# Patient Record
Sex: FEMALE | Race: WHITE
[De-identification: names, ages, dates, MRNs, and addresses within clinical notes are randomized per-mention and may not be internally consistent; named-entity substitution may affect disease eponyms.]

---

## 2021-02-25 ENCOUNTER — HOSPITAL ENCOUNTER (EMERGENCY)
Dept: HOSPITAL 76 - ED | Age: 41
Discharge: HOME | End: 2021-02-25
Payer: MEDICARE

## 2021-02-25 VITALS — DIASTOLIC BLOOD PRESSURE: 85 MMHG | SYSTOLIC BLOOD PRESSURE: 132 MMHG

## 2021-02-25 DIAGNOSIS — R09.81: ICD-10-CM

## 2021-02-25 DIAGNOSIS — M54.2: Primary | ICD-10-CM

## 2021-02-25 DIAGNOSIS — D89.89: ICD-10-CM

## 2021-02-25 LAB
ALBUMIN DIAFP-MCNC: 4.6 G/DL (ref 3.2–5.5)
ALBUMIN/GLOB SERPL: 1.6 {RATIO} (ref 1–2.2)
ALP SERPL-CCNC: 65 IU/L (ref 42–121)
ALT SERPL W P-5'-P-CCNC: 27 IU/L (ref 10–60)
ANION GAP SERPL CALCULATED.4IONS-SCNC: 9 MMOL/L (ref 6–13)
AST SERPL W P-5'-P-CCNC: 20 IU/L (ref 10–42)
BASOPHILS NFR BLD AUTO: 0 10^3/UL (ref 0–0.1)
BASOPHILS NFR BLD AUTO: 0.6 %
BILIRUB BLD-MCNC: 0.8 MG/DL (ref 0.2–1)
BUN SERPL-MCNC: 11 MG/DL (ref 6–20)
CALCIUM UR-MCNC: 9.2 MG/DL (ref 8.5–10.3)
CHLORIDE SERPL-SCNC: 101 MMOL/L (ref 101–111)
CO2 SERPL-SCNC: 25 MMOL/L (ref 21–32)
CREAT SERPLBLD-SCNC: 0.7 MG/DL (ref 0.4–1)
EOSINOPHIL # BLD AUTO: 0.1 10^3/UL (ref 0–0.7)
EOSINOPHIL NFR BLD AUTO: 0.9 %
ERYTHROCYTE [DISTWIDTH] IN BLOOD BY AUTOMATED COUNT: 13.2 % (ref 12–15)
GFRSERPLBLD MDRD-ARVRAT: 92 ML/MIN/{1.73_M2} (ref 89–?)
GLOBULIN SER-MCNC: 2.9 G/DL (ref 2.1–4.2)
GLUCOSE SERPL-MCNC: 89 MG/DL (ref 70–100)
HCT VFR BLD AUTO: 41.8 % (ref 37–47)
HGB UR QL STRIP: 13.4 G/DL (ref 12–16)
LIPASE SERPL-CCNC: 33 U/L (ref 22–51)
LYMPHOCYTES # SPEC AUTO: 2.4 10^3/UL (ref 1.5–3.5)
LYMPHOCYTES NFR BLD AUTO: 33.9 %
MCH RBC QN AUTO: 28.9 PG (ref 27–31)
MCHC RBC AUTO-ENTMCNC: 32.1 G/DL (ref 32–36)
MCV RBC AUTO: 90.1 FL (ref 81–99)
MONOCYTES # BLD AUTO: 0.4 10^3/UL (ref 0–1)
MONOCYTES NFR BLD AUTO: 5.4 %
NEUTROPHILS # BLD AUTO: 4.1 10^3/UL (ref 1.5–6.6)
NEUTROPHILS # SNV AUTO: 7 X10^3/UL (ref 4.8–10.8)
NEUTROPHILS NFR BLD AUTO: 58.9 %
NRBC # BLD AUTO: 0 /100WBC
NRBC # BLD AUTO: 0 X10^3/UL
PDW BLD AUTO: 9.5 FL (ref 7.9–10.8)
PLATELET # BLD: 277 10^3/UL (ref 130–450)
POTASSIUM SERPL-SCNC: 3.8 MMOL/L (ref 3.5–5)
PROT SPEC-MCNC: 7.5 G/DL (ref 6.7–8.2)
RBC MAR: 4.64 10^6/UL (ref 4.2–5.4)
SODIUM SERPLBLD-SCNC: 135 MMOL/L (ref 135–145)

## 2021-02-25 PROCEDURE — 36415 COLL VENOUS BLD VENIPUNCTURE: CPT

## 2021-02-25 PROCEDURE — 84484 ASSAY OF TROPONIN QUANT: CPT

## 2021-02-25 PROCEDURE — 70491 CT SOFT TISSUE NECK W/DYE: CPT

## 2021-02-25 PROCEDURE — 83690 ASSAY OF LIPASE: CPT

## 2021-02-25 PROCEDURE — 80053 COMPREHEN METABOLIC PANEL: CPT

## 2021-02-25 PROCEDURE — 99284 EMERGENCY DEPT VISIT MOD MDM: CPT

## 2021-02-25 PROCEDURE — 84702 CHORIONIC GONADOTROPIN TEST: CPT

## 2021-02-25 PROCEDURE — 85025 COMPLETE CBC W/AUTO DIFF WBC: CPT

## 2021-02-25 PROCEDURE — 93005 ELECTROCARDIOGRAM TRACING: CPT

## 2021-02-25 NOTE — CT REPORT
PROCEDURE:  SOFT TISSUE NECK W

 

INDICATIONS:  left neck/dental pain; ? infection. on oral chemo

 

CONTRAST: Nonionic IV

 

TECHNIQUE:  

After the administration of intravenous contrast, 3.0 mm axial sections acquired from the sella to th
e aortic arch.  Additional oblique axial 3.0 mm sections acquired through the pharynx.  3 mm thick co
ananya reformats were generated.  For radiation dose reduction, the following was used:  automated exp
osure control, adjustment of mA and/or kV according to patient size.

 

COMPARISON:  None.

 

FINDINGS:  

Image quality:  Excellent.  

 

Lymph nodes:  No enlarged lymph nodes seen throughout the neck.  

 

Vessels:  Visualized vasculature appears patent.  

 

Neck spaces: Scrutiny is given to the areas of clinical concern, as marked by the patient BB markers 
on the left. At these sites, no significant inflammatory changes or masses can be seen.

 

The oropharynx, nasopharynx, and pharynx demonstrate no mucosal lesions.  The vocal cords, false voca
l cords, pyriform sinuses, epiglottis, vallecula, and tongue base all appear normal.  

 

Glands:  The parotid and submandibular glands appear normal.  The thyroid is normal in size. 

 

Miscellaneous:  Visualized brain and orbits appear normal.  Lung apices appear clear.  Superficial so
ft tissues appear normal.

 

Bones:  No suspicious bony lesions.  Visualized sinuses and mastoids appear unremarkable.  No signifi
cant dental abnormality can be seen.

 

 

 

IMPRESSION: No significant abnormalities can be seen at the areas of clinical concern.

 

No abscess can be seen.

 

 

 

Note: No significant discrepancy from the preliminary report.

 

Reviewed by: Jaya Buck MD on 2/25/2021 4:58 PM [facility]

Approved by: Jaya Buck MD on 2/25/2021 4:58 PM [facility]

 

 

Station ID:  [id number]

## 2021-03-02 ENCOUNTER — HOSPITAL ENCOUNTER (OUTPATIENT)
Dept: HOSPITAL 76 - LAB.S | Age: 41
Discharge: HOME | End: 2021-03-02
Attending: INTERNAL MEDICINE
Payer: MEDICARE

## 2021-03-02 DIAGNOSIS — Z79.899: ICD-10-CM

## 2021-03-02 DIAGNOSIS — Z51.81: Primary | ICD-10-CM

## 2021-03-02 LAB
BASOPHILS NFR BLD AUTO: 0 10^3/UL (ref 0–0.1)
BASOPHILS NFR BLD AUTO: 0.4 %
EOSINOPHIL # BLD AUTO: 0.1 10^3/UL (ref 0–0.7)
EOSINOPHIL NFR BLD AUTO: 1.6 %
ERYTHROCYTE [DISTWIDTH] IN BLOOD BY AUTOMATED COUNT: 13.4 % (ref 12–15)
HCT VFR BLD AUTO: 42.3 % (ref 37–47)
HGB UR QL STRIP: 13.2 G/DL (ref 12–16)
LYMPHOCYTES # SPEC AUTO: 2.4 10^3/UL (ref 1.5–3.5)
LYMPHOCYTES NFR BLD AUTO: 32 %
MCH RBC QN AUTO: 28.5 PG (ref 27–31)
MCHC RBC AUTO-ENTMCNC: 31.2 G/DL (ref 32–36)
MCV RBC AUTO: 91.4 FL (ref 81–99)
MONOCYTES # BLD AUTO: 0.4 10^3/UL (ref 0–1)
MONOCYTES NFR BLD AUTO: 4.9 %
NEUTROPHILS # BLD AUTO: 4.6 10^3/UL (ref 1.5–6.6)
NEUTROPHILS # SNV AUTO: 7.5 X10^3/UL (ref 4.8–10.8)
NEUTROPHILS NFR BLD AUTO: 60.8 %
NRBC # BLD AUTO: 0 /100WBC
NRBC # BLD AUTO: 0 X10^3/UL
PDW BLD AUTO: 10.2 FL (ref 7.9–10.8)
PLATELET # BLD: 278 10^3/UL (ref 130–450)
RBC MAR: 4.63 10^6/UL (ref 4.2–5.4)

## 2021-03-02 PROCEDURE — 85025 COMPLETE CBC W/AUTO DIFF WBC: CPT

## 2021-03-02 PROCEDURE — 36415 COLL VENOUS BLD VENIPUNCTURE: CPT

## 2021-03-03 ENCOUNTER — HOSPITAL ENCOUNTER (OUTPATIENT)
Dept: HOSPITAL 76 - LAB.S | Age: 41
Discharge: HOME | End: 2021-03-03
Attending: INTERNAL MEDICINE
Payer: MEDICARE

## 2021-03-03 DIAGNOSIS — Z79.899: ICD-10-CM

## 2021-03-03 DIAGNOSIS — Z51.81: Primary | ICD-10-CM

## 2021-03-03 LAB
ALT SERPL W P-5'-P-CCNC: 28 IU/L (ref 10–60)
AST SERPL W P-5'-P-CCNC: 21 IU/L (ref 10–42)
BASOPHILS NFR BLD AUTO: 0 10^3/UL (ref 0–0.1)
BASOPHILS NFR BLD AUTO: 0.5 %
CREAT SERPLBLD-SCNC: 0.7 MG/DL (ref 0.4–1)
CRP SERPL-MCNC: 1.2 MG/DL (ref 0–1)
EOSINOPHIL # BLD AUTO: 0.1 10^3/UL (ref 0–0.7)
EOSINOPHIL NFR BLD AUTO: 1.1 %
ERYTHROCYTE [DISTWIDTH] IN BLOOD BY AUTOMATED COUNT: 13.4 % (ref 12–15)
GFRSERPLBLD MDRD-ARVRAT: 92 ML/MIN/{1.73_M2} (ref 89–?)
HCT VFR BLD AUTO: 40.6 % (ref 37–47)
HGB UR QL STRIP: 13 G/DL (ref 12–16)
LYMPHOCYTES # SPEC AUTO: 2.2 10^3/UL (ref 1.5–3.5)
LYMPHOCYTES NFR BLD AUTO: 28.8 %
MCH RBC QN AUTO: 29 PG (ref 27–31)
MCHC RBC AUTO-ENTMCNC: 32 G/DL (ref 32–36)
MCV RBC AUTO: 90.6 FL (ref 81–99)
MONOCYTES # BLD AUTO: 0.4 10^3/UL (ref 0–1)
MONOCYTES NFR BLD AUTO: 4.6 %
NEUTROPHILS # BLD AUTO: 4.9 10^3/UL (ref 1.5–6.6)
NEUTROPHILS # SNV AUTO: 7.6 X10^3/UL (ref 4.8–10.8)
NEUTROPHILS NFR BLD AUTO: 64.9 %
NRBC # BLD AUTO: 0 /100WBC
NRBC # BLD AUTO: 0 X10^3/UL
PDW BLD AUTO: 10.3 FL (ref 7.9–10.8)
PLATELET # BLD: 287 10^3/UL (ref 130–450)
RBC MAR: 4.48 10^6/UL (ref 4.2–5.4)

## 2021-03-03 PROCEDURE — 84460 ALANINE AMINO (ALT) (SGPT): CPT

## 2021-03-03 PROCEDURE — 36415 COLL VENOUS BLD VENIPUNCTURE: CPT

## 2021-03-03 PROCEDURE — 85025 COMPLETE CBC W/AUTO DIFF WBC: CPT

## 2021-03-03 PROCEDURE — 85651 RBC SED RATE NONAUTOMATED: CPT

## 2021-03-03 PROCEDURE — 86140 C-REACTIVE PROTEIN: CPT

## 2021-03-03 PROCEDURE — 84450 TRANSFERASE (AST) (SGOT): CPT

## 2021-03-03 PROCEDURE — 82565 ASSAY OF CREATININE: CPT

## 2021-03-09 ENCOUNTER — HOSPITAL ENCOUNTER (OUTPATIENT)
Dept: HOSPITAL 76 - DI.S | Age: 41
Discharge: HOME | End: 2021-03-09
Attending: INTERNAL MEDICINE
Payer: MEDICARE

## 2021-03-09 DIAGNOSIS — R05: Primary | ICD-10-CM

## 2021-03-09 NOTE — XRAY REPORT
PROCEDURE:  Chest 2 View X-Ray

 

INDICATIONS:  COUGH

 

TECHNIQUE:  2 view(s) of the chest.  

 

COMPARISON:  None.

 

FINDINGS:  

 

Surgical changes and devices:  None.  

 

Lungs and pleura:  No pleural effusions or pneumothorax.  Lungs are clear.  

 

Mediastinum:  Mediastinal contours are normal.  Heart size is normal.  

 

Bones and chest wall:  No suspicious bony abnormalities.  Soft tissues appear unremarkable.  

 

IMPRESSION:  Normal for age, source of current symptoms is not seen.

 

Reviewed by: Tonny Vazquez MD on 3/9/2021 5:08 PM [facility]

Approved by: Tonny Vazquez MD on 3/9/2021 5:08 PM [facility]

 

 

Station ID:  [id number]

## 2021-03-30 ENCOUNTER — HOSPITAL ENCOUNTER (OUTPATIENT)
Dept: HOSPITAL 76 - RT | Age: 41
Discharge: HOME | End: 2021-03-30
Attending: INTERNAL MEDICINE
Payer: MEDICARE

## 2021-03-30 DIAGNOSIS — J45.909: Primary | ICD-10-CM

## 2021-03-30 PROCEDURE — 94060 EVALUATION OF WHEEZING: CPT

## 2021-03-30 PROCEDURE — 94729 DIFFUSING CAPACITY: CPT

## 2021-05-26 ENCOUNTER — HOSPITAL ENCOUNTER (OUTPATIENT)
Dept: HOSPITAL 76 - LAB.S | Age: 41
Discharge: HOME | End: 2021-05-26
Attending: INTERNAL MEDICINE
Payer: MEDICARE

## 2021-05-26 DIAGNOSIS — Z01.84: Primary | ICD-10-CM

## 2021-05-26 DIAGNOSIS — B34.9: ICD-10-CM

## 2021-05-26 LAB — HETEROPH AB SER QL: NEGATIVE

## 2021-05-26 PROCEDURE — 86308 HETEROPHILE ANTIBODY SCREEN: CPT

## 2021-05-26 PROCEDURE — 86769 SARS-COV-2 COVID-19 ANTIBODY: CPT

## 2021-05-26 PROCEDURE — 36415 COLL VENOUS BLD VENIPUNCTURE: CPT

## 2021-12-15 ENCOUNTER — HOSPITAL ENCOUNTER (OUTPATIENT)
Dept: HOSPITAL 76 - LAB.S | Age: 41
Discharge: HOME | End: 2021-12-15
Attending: INTERNAL MEDICINE
Payer: MEDICARE

## 2021-12-15 DIAGNOSIS — B97.89: ICD-10-CM

## 2021-12-15 DIAGNOSIS — Z01.84: Primary | ICD-10-CM

## 2021-12-15 PROCEDURE — 86769 SARS-COV-2 COVID-19 ANTIBODY: CPT

## 2022-05-03 ENCOUNTER — HOSPITAL ENCOUNTER (OUTPATIENT)
Dept: HOSPITAL 76 - LAB.S | Age: 42
LOS: 1 days | Discharge: HOME | End: 2022-05-04
Attending: PHYSICIAN ASSISTANT
Payer: MEDICARE

## 2022-05-03 ENCOUNTER — HOSPITAL ENCOUNTER (OUTPATIENT)
Dept: HOSPITAL 76 - DI.S | Age: 42
Discharge: HOME | End: 2022-05-03
Attending: PHYSICIAN ASSISTANT
Payer: MEDICARE

## 2022-05-03 DIAGNOSIS — Z20.822: ICD-10-CM

## 2022-05-03 DIAGNOSIS — J34.89: Primary | ICD-10-CM

## 2022-05-03 DIAGNOSIS — R05.9: ICD-10-CM

## 2022-05-03 DIAGNOSIS — J34.89: ICD-10-CM

## 2022-05-03 DIAGNOSIS — R05.8: Primary | ICD-10-CM

## 2022-05-03 PROCEDURE — 71046 X-RAY EXAM CHEST 2 VIEWS: CPT

## 2022-05-03 NOTE — XRAY REPORT
PROCEDURE:  Chest 2 View X-Ray

 

INDICATIONS:  PRODUCTIVE COUGH

 

TECHNIQUE:  2 view(s) of the chest.  

 

COMPARISON:  March 9, 2021

 

 

FINDINGS: 

SUPPORT DEVICES: None.

 

LUNGS/PLEURA: No focal consolidation, pleural effusion or space-occupying pneumothorax.

 

MEDIASTINUM: The cardiomediastinal silhouette is within normal limits.

 

BONES/SOFT TISSUES: No acute abnormality.

 

IMPRESSION: 

1.No acute cardiopulmonary abnormality.

 

 

 

Reviewed by: Yared Pink MD on 5/3/2022 12:12 PM PDT

Approved by: Yared Pink MD on 5/3/2022 12:12 PM PDT

 

 

Station ID:  [id number]

## 2022-06-22 ENCOUNTER — HOSPITAL ENCOUNTER (OUTPATIENT)
Dept: HOSPITAL 76 - LAB.S | Age: 42
Discharge: HOME | End: 2022-06-22
Attending: INTERNAL MEDICINE
Payer: MEDICARE

## 2022-06-22 DIAGNOSIS — Z11.3: Primary | ICD-10-CM

## 2022-06-22 LAB
C TRACH DNA SPEC NAA+PROBE-ACNC: NEGATIVE
N GONORRHOEA DNA GENITAL QL NAA+PROBE: NEGATIVE
T VAGINALIS RRNA GENITAL QL PROBE: NEGATIVE

## 2022-06-22 PROCEDURE — 87389 HIV-1 AG W/HIV-1&-2 AB AG IA: CPT

## 2022-06-22 PROCEDURE — 87340 HEPATITIS B SURFACE AG IA: CPT

## 2022-06-22 PROCEDURE — 86695 HERPES SIMPLEX TYPE 1 TEST: CPT

## 2022-06-22 PROCEDURE — 86705 HEP B CORE ANTIBODY IGM: CPT

## 2022-06-22 PROCEDURE — 36415 COLL VENOUS BLD VENIPUNCTURE: CPT

## 2022-06-22 PROCEDURE — 86803 HEPATITIS C AB TEST: CPT

## 2022-06-22 PROCEDURE — 87529 HSV DNA AMP PROBE: CPT

## 2022-06-22 PROCEDURE — 86780 TREPONEMA PALLIDUM: CPT

## 2022-06-22 PROCEDURE — 86709 HEPATITIS A IGM ANTIBODY: CPT

## 2022-06-22 PROCEDURE — 87491 CHLMYD TRACH DNA AMP PROBE: CPT

## 2022-06-22 PROCEDURE — 86592 SYPHILIS TEST NON-TREP QUAL: CPT

## 2022-06-22 PROCEDURE — 87591 N.GONORRHOEAE DNA AMP PROB: CPT

## 2022-06-22 PROCEDURE — 87661 TRICHOMONAS VAGINALIS AMPLIF: CPT

## 2022-06-22 PROCEDURE — 86696 HERPES SIMPLEX TYPE 2 TEST: CPT

## 2022-06-23 LAB
HCV AB S/CO SERPL IA: <0.1 S/CO RATIO (ref 0–0.9)
HSV1 IGG SER IA-ACNC: <0.91 INDEX (ref 0–0.9)
HSV2 IGG SER IA-ACNC: <0.91 INDEX (ref 0–0.9)

## 2023-04-30 ENCOUNTER — HOSPITAL ENCOUNTER (EMERGENCY)
Dept: HOSPITAL 76 - ED | Age: 43
Discharge: HOME | End: 2023-04-30
Payer: MEDICARE

## 2023-04-30 VITALS — DIASTOLIC BLOOD PRESSURE: 75 MMHG | SYSTOLIC BLOOD PRESSURE: 131 MMHG

## 2023-04-30 DIAGNOSIS — R21: Primary | ICD-10-CM

## 2023-04-30 PROCEDURE — 99283 EMERGENCY DEPT VISIT LOW MDM: CPT

## 2023-04-30 PROCEDURE — 99282 EMERGENCY DEPT VISIT SF MDM: CPT

## 2023-04-30 NOTE — ED PHYSICIAN DOCUMENTATION
PD HPI SKIN





- Stated complaint


Stated Complaint: RASH ON FACE





- Chief complaint


Chief Complaint: Wound





- History obtained from


History obtained from: Patient





- Additional information


Additional information: 


Patient is a 42-year-old female presenting for evaluation of a rash that 

developed on her face yesterday and has spread to her chest and back.  She 

describes the rash as feeling itchy.  There are pustules to the forehead 

region.She tried Benadryl and triamcinolone Yesterday.  She says that the 

swelling she was experiencing yesterday to her face does seem better.  She 

denies any new known exposures.She does report having newly developed allergies 

to various foods since having COVID last year. She has had outbreaks of various 

rashes and has seen a dermatologist at the [facility].  She did 

send a message through the portal system today but did not receive an answer 

back.She denies any new known foods, detergents, face lotions or soaps.She 

denies any chest pain or shortness of breath.  She denies any fevers.








Review of Systems


Constitutional: denies: Fever


Cardiac: denies: Chest pain / pressure


Respiratory: denies: Dyspnea


GI: denies: Abdominal Pain


Skin: reports: Rash





PD PAST MEDICAL HISTORY





- Past Medical History


GYN: Ovarian cysts


: Other


Musculoskeletal: Osteoarthritis, Fibromyalgia, Other





- Past Surgical History


Past Surgical History: Yes


General: Appendectomy, Colonoscopy, EGD





- Present Medications


Home Medications: 


                                Ambulatory Orders











 Medication  Instructions  Recorded  Confirmed


 


Dextroamphetamine/Amphetamine 1 tab DAILY 02/25/21 02/25/21





[Adderall 7.5 mg Tablet]   


 


Methotrexate [Methotrexate Sodium] 7 tab 02/25/21 


 


Mupirocin 1 applic TP TID 7 Days #22 gm 04/30/23 


 


predniSONE [Deltasone] 60 mg PO DAILY 4 Days #12 tablet 04/30/23 














- Allergies


Allergies/Adverse Reactions: 


                                    Allergies











Allergy/AdvReac Type Severity Reaction Status Date / Time


 


amoxicillin [From Augmentin] Allergy  Unknown Verified 04/30/23 10:14


 


cephalexin [From Keflex] Allergy  Unknown Verified 04/30/23 10:14


 


clavulanic acid Allergy  Unknown Verified 04/30/23 10:14





[From Augmentin]     


 


codeine Allergy  Unknown Verified 04/30/23 10:14


 


duloxetine Allergy  Unknown Verified 04/30/23 10:14


 


levofloxacin [From Levaquin] Allergy  Unknown Verified 04/30/23 10:14


 


norgestimate Allergy  Unknown Verified 04/30/23 10:14


 


prazosin Allergy  Unknown Verified 04/30/23 10:14


 


Sulfa (Sulfonamide Allergy  Unknown Verified 04/30/23 10:14





Antibiotics)     


 


dethogestrel Allergy  Unknown Uncoded 04/30/23 10:14














- Social History


Does the pt smoke?: No


Smoking Status: Never smoker





PD ED PE NORMAL





- General


General: Alert and oriented X 3, No acute distress, Well developed/nourished





- HEENT


HEENT: Atraumatic, Moist mucous membranes, Pharynx benign





- Neck


Neck: Supple, no meningeal sign





- Cardiac


Cardiac: RRR, No murmur





- Respiratory


Respiratory: No respiratory distress, Clear bilaterally





- Abdomen


Abdomen: Soft, Non tender





- Derm


Derm: Other (Pustular rash to forehead, Papular erythema to lower face, no oral 

involvement; blanching erythema to chest and back)





- Neuro


Neuro: Normal speech





Results





- Vitals


Vitals: 


                               Vital Signs - 24 hr











  04/30/23 04/30/23





  10:08 10:40


 


Temperature 36.9 C 


 


Heart Rate 71 65


 


Respiratory 17 18





Rate  


 


Blood Pressure 135/91 H 131/75 H


 


O2 Saturation 97 100








                                     Oxygen











O2 Source                      Room air

















PD Medical Decision Making





- ED course


ED course: 


Patient presenting for evaluation of rash to her face and upper torso.  Her 

vital signs are stable.  She has no involvement of mucosal membranes.  She is 

otherwise well-appearing.  She has had prior rashes with unclear etiologies and 

does report having a developed allergies since having COVID last year.  Due to 

widespread involvement I believe oral steroids would be indicated as it is itchy

and appears to be somewhat allergic in nature.There is a pustular rash to the 

forehead.  Discussed treatment with antibiotic ointment which she is agreeable 

to. Does not want oral antibiotics at this time.  Does not appear to be 

cellulitis. She has a dermatologist through the [facility].  She has 

already reached out to them through their patient messaging system and was 

advised to have close follow-up.  She is comfortable with our treatment plan is 

advised on concerning symptoms to return for.








Departure





- Departure


Disposition: 01 Home, Self Care


Clinical Impression: 


 Rash and nonspecific skin eruption





Condition: Stable


Instructions:  ED Dermatitis Non Specific Rash


Prescriptions: 


predniSONE [Deltasone] 60 mg PO DAILY 4 Days #12 tablet


Mupirocin 1 applic TP TID 7 Days #22 gm


Comments: 


The exact etiology of your rash is unclear. There might be 2 processes going on.

 I am starting you on an oral steroid called prednisone for the next 5 days 

given the spread of the rash.  I am also prescribing a topical antibiotic 

ointment for use over the pustular area on your forehead.  I would recommend 

close follow-up with your dermatologist tomorrow for further evaluation and 

recommendations.  If you develop any new or worsening symptoms you can always 

return to the emergency department.  I would continue with Benadryl to help 

prevent itching of any irritated areas.





I have sent your prescriptions to [facility] in [place].


Discharge Date/Time: 04/30/23 10:40

## 2023-05-01 ENCOUNTER — HOSPITAL ENCOUNTER (EMERGENCY)
Dept: HOSPITAL 76 - ED | Age: 43
Discharge: HOME | End: 2023-05-01
Payer: MEDICARE

## 2023-05-01 VITALS — SYSTOLIC BLOOD PRESSURE: 149 MMHG | DIASTOLIC BLOOD PRESSURE: 104 MMHG

## 2023-05-01 DIAGNOSIS — R21: Primary | ICD-10-CM

## 2023-05-01 PROCEDURE — 87529 HSV DNA AMP PROBE: CPT

## 2023-05-01 PROCEDURE — 86787 VARICELLA-ZOSTER ANTIBODY: CPT

## 2023-05-01 PROCEDURE — 99283 EMERGENCY DEPT VISIT LOW MDM: CPT

## 2023-05-01 PROCEDURE — 87205 SMEAR GRAM STAIN: CPT

## 2023-05-01 PROCEDURE — 87070 CULTURE OTHR SPECIMN AEROBIC: CPT

## 2023-05-01 NOTE — ED PHYSICIAN DOCUMENTATION
PD HPI SKIN





- Stated complaint


Stated Complaint: SENT BY PCP





- Chief complaint


Chief Complaint: Wound





- History obtained from


History obtained from: Patient





- History of Present Illness


Pain level max: 0


Pain level now: 0





- Additional information


Additional information: 





42-year-old female states that she has had a rash to the forehead, chest, back, 

buttocks ongoing for the past several months.  She states it did improve 

temporarily with doxycycline.  She states that she saw a dermatologist at the 

[facility] who wanted her to have a herpes swab and varicella 

titers.  He told her that next time she has an outbreak to come and be 

evaluated.  She attempted to get the order done at the lab but the order was not

signed, therefore she has checked into the emergency department.





Review of Systems


Constitutional: denies: Fever, Chills


Nose: denies: Rhinorrhea / runny nose, Congestion


Respiratory: denies: Cough


GI: denies: Abdominal Pain, Nausea, Vomiting, Diarrhea


Skin: denies: Rash


Musculoskeletal: denies: Neck pain, Back pain


Neurologic: denies: Headache





PD PAST MEDICAL HISTORY





- Past Medical History


Past Medical History: Yes


GYN: Ovarian cysts


: Other


Musculoskeletal: Osteoarthritis, Fibromyalgia, Other





- Past Surgical History


Past Surgical History: Yes


General: Appendectomy, Colonoscopy, EGD





- Present Medications


Home Medications: 


                                Ambulatory Orders











 Medication  Instructions  Recorded  Confirmed


 


Dextroamphetamine/Amphetamine 1 tab DAILY 02/25/21 02/25/21





[Adderall 7.5 mg Tablet]   


 


Methotrexate [Methotrexate Sodium] 7 tab 02/25/21 


 


Mupirocin 1 applic TP TID 7 Days #22 gm 04/30/23 


 


predniSONE [Deltasone] 60 mg PO DAILY 4 Days #12 tablet 04/30/23 


 


Doxycycline Monohydrate 100 mg PO BID #42 cap 05/01/23 


 


Valacyclovir HCl [Valtrex] 1,000 mg PO TID #21 tablet 05/01/23 














- Allergies


Allergies/Adverse Reactions: 


                                    Allergies











Allergy/AdvReac Type Severity Reaction Status Date / Time


 


amoxicillin [From Augmentin] Allergy  Unknown Verified 05/01/23 15:15


 


cephalexin [From Keflex] Allergy  Unknown Verified 05/01/23 15:15


 


clavulanic acid Allergy  Unknown Verified 05/01/23 15:15





[From Augmentin]     


 


codeine Allergy  Unknown Verified 05/01/23 15:15


 


duloxetine Allergy  Unknown Verified 04/30/23 10:14


 


levofloxacin [From Levaquin] Allergy  Unknown Verified 05/01/23 15:15


 


norgestimate Allergy  Unknown Verified 05/01/23 15:15


 


prazosin Allergy  Unknown Verified 05/01/23 15:15


 


Sulfa (Sulfonamide Allergy  Unknown Verified 05/01/23 15:15





Antibiotics)     


 


dethogestrel Allergy  Unknown Uncoded 05/01/23 15:15














- Social History


Does the pt smoke?: No


Smoking Status: Never smoker





- POLST


Patient has POLST: No





PD ED PE NORMAL





- Vitals


Vital signs reviewed: Yes





- General


General: Alert and oriented X 3, No acute distress





- HEENT


HEENT: Moist mucous membranes





- Neck


Neck: Supple, no meningeal sign





- Cardiac


Cardiac: RRR





- Respiratory


Respiratory: No respiratory distress, Clear bilaterally





- Derm


Derm: Warm and dry





- Neuro


Neuro: Alert and oriented X 3





- Free text exam


Free text exam: 





Patient has a pustular rash on the forehead, also on the neck.





Results





- Vitals


Vitals: 


                               Vital Signs - 24 hr











  05/01/23





  15:07


 


Temperature 36.6 C


 


Heart Rate 76


 


Respiratory 16





Rate 


 


Blood Pressure 149/104 H


 


O2 Saturation 98








                                     Oxygen











O2 Source                      Room air

















- Labs


Labs: 


                                  Microbiology











 05/01/23 15:31 Wound Culture - Preliminary





 Face - Forehead 














PD Medical Decision Making





- ED course


Complexity details: re-evaluated patient, considered differential, d/w patient


ED course: 





Patient has had recurrent pustular rash.  Reports occasionally possibly 

vesicular.  The test that her dermatologist requested were ordered.  She states 

that it did improve once with doxycycline, but she was only on this for a week. 

Possible folliculitis versus pustular acne versus hormonal rash.  We will place 

on doxycycline for home.  Her dermatologist also requests valacyclovir.  We will

place her on this as well.  Patient is well-appearing, nontoxic.  Afebrile.  

Patient counseled regarding signs and symptoms for which I believe and urgent 

re-evaluation would be necessary. Patient with good understanding of and 

agreement to plan and is comfortable going home at this time





This document was made in part using voice recognition software. While efforts 

are made to proofread this document, sound alike and grammatical errors may 

occur.





Patient could also try hypochlorus acid.





Departure





- Departure


Disposition: 01 Home, Self Care


Clinical Impression: 


 Rash and nonspecific skin eruption





Condition: Good


Instructions:  ED Folliculitis


Follow-Up: 


your,doctor in  1week [Other]


Prescriptions: 


Doxycycline Monohydrate 100 mg PO BID #42 cap


Valacyclovir HCl [Valtrex] 1,000 mg PO TID #21 tablet


Comments: 


Your prescription was sent to [facility] in [place].  Please follow-up with your 

dermatologist for further care.  A culture swab was sent in addition to the test

 ordered by your dermatologist.  You can also look for hypochlorous acid to 

apply to your skin and see if this helps as well. This is usually in a lotion 

based format or facial spray


Discharge Date/Time: 05/01/23 16:03

## 2023-05-02 LAB
VZV IGG SER IA-ACNC: 1713 INDEX
VZV IGM SER IA-ACNC: <0.91 INDEX (ref 0–0.9)

## 2023-09-28 ENCOUNTER — HOSPITAL ENCOUNTER (OUTPATIENT)
Dept: HOSPITAL 76 - DI.S | Age: 43
Discharge: HOME | End: 2023-09-28
Attending: INTERNAL MEDICINE
Payer: MEDICARE

## 2023-09-28 DIAGNOSIS — M17.12: Primary | ICD-10-CM

## 2023-09-28 DIAGNOSIS — R20.0: ICD-10-CM

## 2023-09-28 DIAGNOSIS — R20.2: ICD-10-CM

## 2023-09-28 DIAGNOSIS — M25.521: Primary | ICD-10-CM

## 2023-09-29 NOTE — XRAY REPORT
PROCEDURE:  Elbow 3 View RT

 

INDICATIONS:  PAIN IN RIGHT ELBOW

 

TECHNIQUE:  3 views of the elbow were acquired.  

 

COMPARISON:  None

 

FINDINGS:  

 

Bones:  No fractures or dislocations.  No suspicious bony lesions.  

 

Soft tissues:  No elbow joint effusion.  No suspicious soft tissue calcifications.  

 

IMPRESSION:  

 

Unremarkable elbow radiographs

 

Reviewed by: Wilfredo Khan MD on 9/29/2023 10:35 AM [facility]

Approved by: Wilfredo Khan MD on 9/29/2023 10:35 AM [facility]

 

 

Station ID:  [id number]

## 2023-09-29 NOTE — XRAY REPORT
PROCEDURE:  Knee 4 View LT

 

INDICATIONS:  KNEE PAIN

 

TECHNIQUE:  3 views of the knee was obtained.  

 

COMPARISON:  None

 

FINDINGS:  

 

Bones:  No fractures or dislocations.  No suspicious bony lesions. Mild medial compartment joint spac
e narrowing 

 

Soft tissues:  No knee joint effusion. No suspicious soft tissue calcifications or masses.  

 

IMPRESSION:  

 

Mild joint space narrowing. No fracture or joint effusion

 

Reviewed by: Wilfredo Khan MD on 9/29/2023 10:36 AM [facility]

Approved by: Wilfredo Khan MD on 9/29/2023 10:36 AM [facility]

 

 

Station ID:  [id number]

## 2023-12-18 NOTE — ED PHYSICIAN DOCUMENTATION
History of Present Illness





- Stated complaint


Stated Complaint: NECK JAW TOOTH & SINUS PX





- Chief complaint


Chief Complaint: Heent





- Additonal information


Additional information: 


40-year-old female presents the emergency department for evaluation of 4 days 

left-sided neck pain.  She is concerned that she may have an underlying ental or

neck infection.  She reports that in April 2020 she contracted COVID-19.  

Subsequently she has developed an autoimmune disease that is not yet fully 

understood.  However her doctors have started her on methotrexate orally.  This 

began 4 weeks ago and each week she takes an increased dose.  She is scheduled 

to take her dose this week but her rheumatologist requested she be seen in the 

emergency department for concerns of the neck pain.  She denies any fevers.  

Does report some pain in the left upper jaw but denies chest pain or shortness 

of breath.  Denies cough, weight loss





PMH:  negative


SOC:  no tobacco











Review of Systems


Constitutional: denies: Fever, Chills


Eyes: reports: Reviewed and negative


Ears: reports: Reviewed and negative


Nose: reports: Congestion, Sinus pressure / pain


Throat: reports: Dental pain / toothache, Other (left jaw pain).  denies: Oral 

lesions / sores, Sore throat


Cardiac: denies: Chest pain / pressure, Palpitations


Respiratory: denies: Dyspnea, Cough


GI: denies: Abdominal Pain, Abdominal Swelling, Nausea


: denies: Dysuria, Frequency, Hesitancy


Skin: denies: Rash, Lesions


Musculoskeletal: reports: Neck pain.  denies: Back pain, Extremity pain


Neurologic: denies: Generalized weakness, Focal weakness, Difficulty speaking, 

Near syncope, Syncope, Confused, Headache, LOC





PD PAST MEDICAL HISTORY





- Present Medications


Home Medications: 


                                Ambulatory Orders











 Medication  Instructions  Recorded  Confirmed


 


Dextroamphetamine/Amphetamine 1 tab DAILY 02/25/21 02/25/21





[Adderall 7.5 mg Tablet]   


 


Methotrexate [Methotrexate Sodium] 7 tab 02/25/21 














- Allergies


Allergies/Adverse Reactions: 


                                    Allergies











Allergy/AdvReac Type Severity Reaction Status Date / Time


 


amoxicillin [From Augmentin] Allergy  Unknown Verified 02/25/21 15:26


 


cephalexin [From Keflex] Allergy  Unknown Verified 02/25/21 15:26


 


clavulanic acid Allergy  Unknown Verified 02/25/21 15:26





[From Augmentin]     


 


codeine Allergy  Unknown Verified 02/25/21 15:26


 


duloxetine Allergy  Unknown Verified 02/25/21 15:26


 


levofloxacin [From Levaquin] Allergy  Unknown Verified 02/25/21 15:26


 


norgestimate Allergy  Unknown Verified 02/25/21 15:26


 


prazosin Allergy  Unknown Verified 02/25/21 15:26


 


Sulfa (Sulfonamide Allergy  Unknown Verified 02/25/21 15:26





Antibiotics)     


 


dethogestrel Allergy  Unknown Uncoded 02/25/21 15:26














PD ED PE EXPANDED





- General


General: Alert, No acute distress, Well developed/nourished





- HEENT


HEENT: PERRL, EOMI, Moist mucous membranes, Pharynx normal, Dentition normal (No

tenderness elicited with palpation of the upper or lower teeth.  No gumline 

swelling, no trismus.).  No: Pharyngeal erythema, Swollen tonsils, Tonsillar 

exudate, Dental abscess, Dry socket, Oral lesions / sores, Lip laceration





- Eyes


Eyes: PERRL





- Neck


Neck: Supple w/out meningeal sx, No tenderness.  No: Adenopathy





- Cardiac


Cardiac: Regular Rhythm, Radial strong equal, Pedal strong equal, Cap refill < 2

sec





- Respiratory


Respiratory: Clear to ausultation uzair.  No: Distress, Labored





- Abdomen


Abdomen: Normal Bowel sounds.  No: Tender to palpation





- Extremities


Extremities: Normal.  No: Deformity, Tenderness





Results





- Vitals


Vitals: 


                               Vital Signs - 24 hr











  02/25/21 02/25/21





  14:52 15:21


 


Temperature 36.9 C 36.7 C


 


Heart Rate 66 56 L


 


Respiratory 18 14





Rate  


 


Blood Pressure 146/66 H 140/81 H


 


O2 Saturation 98 100








                                     Oxygen











O2 Source                      Room air

















- EKG (time done)


  ** 1523


Rate: Rate (enter#) (54)


Rhythm: NSR


Axis: Normal


Intervals: Normal [place]


QRS: Normal


Ischemia: Normal ST segments


Compare to prior EKG: Old EKG unavailable


Computer interpretation: Agree with computer





- Labs


Labs: 


                                Laboratory Tests











  02/25/21 02/25/21 02/25/21





  15:24 15:24 15:24


 


WBC  7.0  


 


RBC  4.64  


 


Hgb  13.4  


 


Hct  41.8  


 


MCV  90.1  


 


MCH  28.9  


 


MCHC  32.1  


 


RDW  13.2  


 


Plt Count  277  


 


MPV  9.5  


 


Neut # (Auto)  4.1  


 


Lymph # (Auto)  2.4  


 


Mono # (Auto)  0.4  


 


Eos # (Auto)  0.1  


 


Baso # (Auto)  0.0  


 


Absolute Nucleated RBC  0.00  


 


Nucleated RBC %  0.0  


 


Sodium   135 


 


Potassium   3.8 


 


Chloride   101 


 


Carbon Dioxide   25 


 


Anion Gap   9.0 


 


BUN   11 


 


Creatinine   0.7 


 


Estimated GFR (MDRD)   92 


 


Glucose   89 


 


Calcium   9.2 


 


Total Bilirubin   0.8 


 


AST   20 


 


ALT   27 


 


Alkaline Phosphatase   65 


 


Troponin I High Sens   


 


Total Protein   7.5 


 


Albumin   4.6 


 


Globulin   2.9 


 


Albumin/Globulin Ratio   1.6 


 


Lipase   33 


 


HCG, Quant    < 0.60














  02/25/21





  15:24


 


WBC 


 


RBC 


 


Hgb 


 


Hct 


 


MCV 


 


MCH 


 


MCHC 


 


RDW 


 


Plt Count 


 


MPV 


 


Neut # (Auto) 


 


Lymph # (Auto) 


 


Mono # (Auto) 


 


Eos # (Auto) 


 


Baso # (Auto) 


 


Absolute Nucleated RBC 


 


Nucleated RBC % 


 


Sodium 


 


Potassium 


 


Chloride 


 


Carbon Dioxide 


 


Anion Gap 


 


BUN 


 


Creatinine 


 


Estimated GFR (MDRD) 


 


Glucose 


 


Calcium 


 


Total Bilirubin 


 


AST 


 


ALT 


 


Alkaline Phosphatase 


 


Troponin I High Sens  < 2.3 L


 


Total Protein 


 


Albumin 


 


Globulin 


 


Albumin/Globulin Ratio 


 


Lipase 


 


HCG, Quant 














- Rads (name of study)


  ** CT soft tissue neck


Radiology: Final report received (No acute inflammatory findings.)





PD MEDICAL DECISION MAKING





- ED course


Complexity details: reviewed results, re-evaluated patient, d/w patient


ED course: 


40-year-old female presents emergency department for evaluation of acute left 

neck pain.  She is concerned that she may have an underlying infection as she 

recently started methotrexate treatment for an unclear autoimmune disorder that 

began after she had Covid 19 in April of last year.  On clinical exam there were

no adventitious fine certainly no lymphadenopathy her teeth are in good repair 

there was no trismus posterior oropharynx erythema.  She had full range of 

motion of the neck.  Screening labs are also unremarkable.  Patient is hesitate 

to take her methotrexate without confirming that she did not have a deeper space

neck infection therefore we did do a CT soft tissue the neck without any acute 

inflammatory findings specifically the teeth appear in good repair.  There are 

no salivary gland stones.  Thyroid was also unremarkable and there was no 

lymphadenopathy.  Findings were discussed with the patient.  She has had some r

elief using a warm compress I recommend she continue that as well as Tylenol.  

Follow-up with her rheumatologist.








Departure





- Departure


Disposition: 01 Home, Self Care


Clinical Impression: 


 Neck pain on left side, Nasal congestion, Autoimmune disorder





Condition: Stable


Record reviewed to determine appropriate education?: Yes


Follow-Up: 


Provider,Other [Primary Care Provider] - 


Comments: 


You were seen in the emergency department today for sinus congestion and left-

sided neck pain.  Your screening labs are unremarkable and essentially normal.  

Your physical exam was also essentially normal.  We did do a CT of your neck 

that did not show any worrisome findings.





The cause of your pain is not clear though I feel very comfortable saying that 

this is not an infection within the neck.  I think it is safe to continue taking

the methotrexate.  I recommend that you continue the [place] pot for your nasal 

congestion.





Discuss this ED visit with your primary doctor as well as your rheumatologist.  

If you develop fevers, have severe facial pain, have milky drainage from your 

nose or facial swelling please return immediately to the emergency department. P.o. Augmentin started  Follow-up with dentist as outpatient

## 2024-08-22 ENCOUNTER — HOSPITAL ENCOUNTER (EMERGENCY)
Dept: HOSPITAL 76 - ED | Age: 44
Discharge: HOME | End: 2024-08-22
Payer: MEDICARE

## 2024-08-22 VITALS — DIASTOLIC BLOOD PRESSURE: 98 MMHG | OXYGEN SATURATION: 98 % | SYSTOLIC BLOOD PRESSURE: 148 MMHG

## 2024-08-22 DIAGNOSIS — M79.7: ICD-10-CM

## 2024-08-22 DIAGNOSIS — J20.8: Primary | ICD-10-CM

## 2024-08-22 DIAGNOSIS — M45.9: ICD-10-CM

## 2024-08-22 PROCEDURE — 99283 EMERGENCY DEPT VISIT LOW MDM: CPT

## 2024-08-22 NOTE — ED PHYSICIAN DOCUMENTATION
History of Present Illness





- Stated complaint


Stated Complaint: SORE THROAT,COUGH,CONGESTION





- History obtained from


History obtained from: Patient





- Additonal information


Additional information: 





44-year-old woman with history of ankylosing spondylitis presents with a little 

over weeks illness starting with a sore throat and then it moved down into her 

chest with a nonproductive cough.  No fevers.  She does have laryngitis as well.

 2 home COVID tests were negative.





PD PAST MEDICAL HISTORY





- Past Medical History


GYN: Ovarian cysts


: Other


Musculoskeletal: Osteoarthritis, Fibromyalgia, Other





- Past Surgical History


Past Surgical History: Yes


General: Appendectomy, Colonoscopy, EGD





- Present Medications


Home Medications: 


                                Ambulatory Orders











 Medication  Instructions  Recorded  Confirmed


 


Dextroamphetamine/Amphetamine 1 tab DAILY 02/25/21 02/25/21





[Adderall 7.5 mg Tablet]   


 


Methotrexate [Methotrexate Sodium] 7 tab 02/25/21 


 


Mupirocin 1 applic TP TID 7 Days #22 gm 04/30/23 


 


predniSONE [Deltasone] 60 mg PO DAILY 4 Days #12 tablet 04/30/23 


 


Doxycycline Monohydrate 100 mg PO BID #42 cap 05/01/23 


 


Valacyclovir HCl [Valtrex] 1,000 mg PO TID #21 tablet 05/01/23 


 


Albuterol Sulf [Ventolin Hfa 1 - 2 puffs INH Q4HR PRN #1 each 08/22/24 





Inhaler]   


 


guaiFENesin/CODEINE [Robitussin AC] 5 - 10 ml PO Q6H PRN #120 ml 08/22/24 


 


predniSONE [Deltasone] 20 mg PO [id number] #21 tab 08/22/24 














- Allergies


Allergies/Adverse Reactions: 


                                    Allergies











Allergy/AdvReac Type Severity Reaction Status Date / Time


 


amoxicillin [From Augmentin] Allergy  Unknown Verified 05/01/23 15:15


 


cephalexin [From Keflex] Allergy  Unknown Verified 05/01/23 15:15


 


clavulanic acid Allergy  Unknown Verified 05/01/23 15:15





[From Augmentin]     


 


codeine Allergy  Unknown Verified 05/01/23 15:15


 


duloxetine Allergy  Unknown Verified 04/30/23 10:14


 


levofloxacin [From Levaquin] Allergy  Unknown Verified 05/01/23 15:15


 


norgestimate Allergy  Unknown Verified 05/01/23 15:15


 


prazosin Allergy  Unknown Verified 05/01/23 15:15


 


Sulfa (Sulfonamide Allergy  Unknown Verified 05/01/23 15:15





Antibiotics)     


 


dethogestrel Allergy  Unknown Uncoded 05/01/23 15:15














- Social History


Does the pt smoke?: No


Smoking Status: Never smoker


Does the pt drink ETOH?: Yes


Does the pt have substance abuse?: No





- Immunizations


Immunizations are current?: Yes





- POLST


Patient has POLST: No





PD ED PE NORMAL





- Vitals


Vital signs reviewed: Yes





- General


General: Alert and oriented X 3, No acute distress





- HEENT


HEENT: Pharynx benign





- Neck


Neck: Supple, no meningeal sign





- Cardiac


Cardiac: RRR, No murmur





- Respiratory


Respiratory: No respiratory distress, Other (Mild expiratory wheezing without 

focal findings)





- Derm


Derm: Normal color, Warm and dry





- Neuro


Neuro: Alert and oriented X 3





Results





- Vitals


Vitals: 





                                     Oxygen











O2 Source                      Room air

















PD Medical Decision Making





- ED course


ED course: 





Seeming like a viral respiratory process.  I do not think specific diagnostic 

testing is necessary.  She is treated symptomatically.





Departure





- Departure


Disposition: 01 Home, Self Care


Clinical Impression: 


 Viral bronchitis





Condition: Good


Record reviewed to determine appropriate education?: Yes


Instructions:  ED Upper Resp Infec No  Abx Tx


Prescriptions: 


Albuterol Sulf [Ventolin Hfa Inhaler] 1 - 2 puffs INH Q4HR PRN #1 each


 PRN Reason: Shortness Of Air/Wheezing


predniSONE [Deltasone] 20 mg PO [id number] #21 tab


guaiFENesin/CODEINE [Robitussin AC] 5 - 10 ml PO Q6H PRN #120 ml


 PRN Reason: Cough


Comments: 


I sent your prescriptions electronically to the [facility] pharmacy

at the corner of [address] [address] and [address] in [place].  Do not drink or

drive while taking codeine containing cough syrup.  Return for new or worsening 

symptoms.  Follow-up with your primary care physician in about a week if not 

better.

## 2024-08-26 ENCOUNTER — HOSPITAL ENCOUNTER (EMERGENCY)
Dept: HOSPITAL 76 - ED | Age: 44
Discharge: HOME | End: 2024-08-26
Payer: MEDICARE

## 2024-08-26 VITALS — OXYGEN SATURATION: 99 % | DIASTOLIC BLOOD PRESSURE: 81 MMHG | SYSTOLIC BLOOD PRESSURE: 146 MMHG

## 2024-08-26 DIAGNOSIS — M79.7: ICD-10-CM

## 2024-08-26 DIAGNOSIS — J20.8: Primary | ICD-10-CM

## 2024-08-26 LAB
B PARAPERT DNA SPEC QL NAA+PROBE: NOT DETECTED
B PERT DNA SPEC QL NAA+PROBE: NOT DETECTED
C PNEUM DNA NPH QL NAA+NON-PROBE: NOT DETECTED
FLUAV RNA RESP QL NAA+PROBE: NOT DETECTED
HAEM INFLU B DNA SPEC QL NAA+PROBE: NOT DETECTED
HCOV 229E RNA SPEC QL NAA+PROBE: NOT DETECTED
HCOV HKU1 RNA UPPER RESP QL NAA+PROBE: NOT DETECTED
HCOV NL63 RNA ASPIRATE QL NAA+PROBE: NOT DETECTED
HCOV OC43 RNA SPEC QL NAA+PROBE: NOT DETECTED
HMPV AG SPEC QL: NOT DETECTED
HPIV1 RNA NPH QL NAA+PROBE: NOT DETECTED
HPIV2 SPEC QL CULT: NOT DETECTED
HPIV3 AB TITR SER CF: NOT DETECTED {TITER}
HPIV4 RNA SPEC QL NAA+PROBE: NOT DETECTED
M PNEUMO DNA SPEC QL NAA+PROBE: NOT DETECTED
RSV RNA RESP QL NAA+PROBE: NOT DETECTED
RV+EV RNA SPEC QL NAA+PROBE: DETECTED
SARS-COV-2 RNA PNL SPEC NAA+PROBE: NOT DETECTED

## 2024-08-26 PROCEDURE — 99283 EMERGENCY DEPT VISIT LOW MDM: CPT

## 2024-08-26 PROCEDURE — 71046 X-RAY EXAM CHEST 2 VIEWS: CPT

## 2024-08-26 PROCEDURE — 87633 RESP VIRUS 12-25 TARGETS: CPT

## 2024-08-26 PROCEDURE — 94640 AIRWAY INHALATION TREATMENT: CPT

## 2024-08-26 PROCEDURE — 99284 EMERGENCY DEPT VISIT MOD MDM: CPT

## 2024-08-26 RX ADMIN — IPRATROPIUM BROMIDE AND ALBUTEROL SULFATE STA ML: 2.5; .5 SOLUTION RESPIRATORY (INHALATION) at 22:21

## 2024-08-26 RX ADMIN — BENZONATATE STA MG: 100 CAPSULE ORAL at 23:48

## 2024-08-26 RX ADMIN — IPRATROPIUM BROMIDE AND ALBUTEROL SULFATE STA ML: 2.5; .5 SOLUTION RESPIRATORY (INHALATION) at 23:49

## 2024-08-26 NOTE — ED PHYSICIAN DOCUMENTATION
PD HPI DYSPNEA





- Stated complaint


Stated Complaint: SOA/COUGH





- Chief complaint


Chief Complaint: Resp





- History obtained from


History obtained from: Patient





- Additional information


Additional information: 





Patient complains of 1 to 2 weeks of cough, shortness of breath, wheezing.  She 

was treated released from this emergency department 4 days ago for the symptoms,

prescribed a 10-day tapering course of prednisone, Robitussin AC, and albuterol 

MDI.


Patient says the symptoms have worsened with increasing cough and shortness of 

breath.  She says the cough is causing "my ribs to dislocate" (per patient).  

She says her ribs dislocate due to the coughing with underlying Anne Marie-Danlos 

syndrome.  She says she stopped taking the Robitussin AC because she is also 

taking Mucinex with Tylenol, and did not want to take too much Tylenol.  She 

also says "my kidneys hurt" and she stopped taking the Robitussin AC because of 

bilateral back pain as well.  Patient says she contacted her primary care 

provider today (who practices in [place]) and was told to come to the emergency 

department for chest x-ray, respiratory PCR panel, and nebulizer treatment.  

Patient says she would have come back to the emergency department earlier but 

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





Review of Systems


Constitutional: reports: Myalgias.  denies: Fever


Cardiac: denies: Chest pain / pressure, Pedal edema


Respiratory: reports: Dyspnea, Cough, Wheezing.  denies: Hemoptysis


Musculoskeletal: reports: Back pain





PD PAST MEDICAL HISTORY





- Past Medical History


Past Medical History: Yes


GYN: Ovarian cysts


: Other


Musculoskeletal: Osteoarthritis, Fibromyalgia, Other





- Past Surgical History


Past Surgical History: Yes


General: Appendectomy, Colonoscopy, EGD





- Present Medications


Home Medications: 


                                Ambulatory Orders











 Medication  Instructions  Recorded  Confirmed


 


Dextroamphetamine/Amphetamine 1 tab DAILY 02/25/21 02/25/21





[Adderall 7.5 mg Tablet]   


 


Methotrexate [Methotrexate Sodium] 7 tab 02/25/21 


 


Mupirocin 1 applic TP TID 7 Days #22 gm 04/30/23 


 


predniSONE [Deltasone] 60 mg PO DAILY 4 Days #12 tablet 04/30/23 


 


Doxycycline Monohydrate 100 mg PO BID #42 cap 05/01/23 


 


Valacyclovir HCl [Valtrex] 1,000 mg PO TID #21 tablet 05/01/23 


 


Albuterol Sulf [Ventolin Hfa 1 - 2 puffs INH Q4HR PRN #1 each 08/22/24 





Inhaler]   


 


guaiFENesin/CODEINE [Robitussin AC] 5 - 10 ml PO Q6H PRN #120 ml 08/22/24 


 


predniSONE [Deltasone] 20 mg PO [id number] #21 tab 08/22/24 


 


Benzonatate [Tessalon] 100 mg PO TID PRN #30 cap 08/26/24 


 


Ipratropium/Albuterol [Duoneb] 3 ml INH Q6H PRN #20 ml 08/26/24 


 


Nebulizer 1 each MC Q6HR #1 ea 08/26/24 














- Allergies


Allergies/Adverse Reactions: 


                                    Allergies











Allergy/AdvReac Type Severity Reaction Status Date / Time


 


amoxicillin [From Augmentin] Allergy  Hives Verified 08/26/24 21:21


 


cephalexin [From Keflex] Allergy  Hives Verified 08/26/24 21:21


 


clavulanic acid Allergy  Hives Verified 08/26/24 21:21





[From Augmentin]     


 


codeine Allergy  Itching Verified 08/26/24 21:21


 


duloxetine Allergy  Itching Verified 08/26/24 21:21


 


levofloxacin [From Levaquin] Allergy  Hives Verified 08/26/24 21:21


 


norgestimate Allergy  Itching Verified 08/26/24 21:21


 


prazosin Allergy  Itching Verified 08/26/24 21:21


 


Sulfa (Sulfonamide Allergy  Hives Verified 08/26/24 21:21





Antibiotics)     


 


dethogestrel Allergy  Itching Uncoded 08/26/24 21:21














- Social History


Does the pt smoke?: No


Smoking Status: Never smoker


Does the pt drink ETOH?: Yes


Does the pt have substance abuse?: No





- Immunizations


Immunizations are current?: Yes





- POLST


Patient has POLST: No





PD ED PE NORMAL





- Vitals


Vital signs reviewed: Yes





- General


General: Alert and oriented X 3, Other (lying face-down)





- HEENT


HEENT: Moist mucous membranes, Pharynx benign





- Neck


Neck: Supple, no meningeal sign





- Cardiac


Cardiac: RRR, No murmur





- Respiratory


Respiratory: No respiratory distress





- Extremities


Extremities: No edema





PD ED PE EXPANDED





- Respiratory


Respiratory: Wheezing (bilateral end-expiratory wheezing, no rhonchi)





Results





- Vitals


Vitals: 


                                     Oxygen











O2 Source                      Room air

















- Labs


Labs: 


                                Laboratory Tests











  08/26/24





  21:28


 


Nasal Adenovirus (PCR)  NOT DETECTED


 


Nasal B. parapertussis DNA (PCR)  NOT DETECTED


 


Nasal Coronavir 229E PCR  NOT DETECTED


 


Nasal Coronavir HKU1 PCR  NOT DETECTED


 


Nasal Coronavir NL63 PCR  NOT DETECTED


 


Nasal Coronavir OC43 PCR  NOT DETECTED


 


Nasal Enterovir/Rhinovir PCR  DETECTED A


 


Nasal Influenza B PCR  NOT DETECTED


 


Nasal Influenza A PCR  NOT DETECTED


 


Nasal Parainfluen 1 PCR  NOT DETECTED


 


Nasal Parainfluen 2 PCR  NOT DETECTED


 


Nasal Parainfluen 3 PCR  NOT DETECTED


 


Nasal Parainfluen 4 PCR  NOT DETECTED


 


Nasal RSV (PCR)  NOT DETECTED


 


Nasal B.pertussis DNA PCR  NOT DETECTED


 


Nasal C.pneumoniae (PCR)  NOT DETECTED


 


Reese Human Metapneumo PCR  NOT DETECTED


 


Nasal M.pneumoniae (PCR)  NOT DETECTED


 


Nasal SARS-CoV-2 (PCR)  NOT DETECTED














- Rads (name of study)


  ** chest xray


Relevant Findings:: Prelim report reviewed, See rad report





PD Medical Decision Making





- ED course


Complexity details: reviewed old records, reviewed results, re-evaluated 

patient, considered differential, d/w patient


ED course: 





Normal CXR. Respiratory PCR panel is positive for entero/rhinovirus. After 

duoneb she is sitting up, in NAD, and reports feeling much improved. Results d/w

patient. She is given rx for duoneb solution for nebulizer. She initially tells 

me she has two nebulizers at home but asks to be prescribed one just as she was 

being discharged and thus I also sent rx for nebulizer to her pharmacy of 

choice.





Departure





- Departure


Disposition: 01 Home, Self Care


Clinical Impression: 


 Viral bronchitis





Condition: Good


Instructions:  ED Upper Resp Infec No  Abx Tx


Prescriptions: 


Nebulizer 1 each MC Q6HR #1 ea


Ipratropium/Albuterol [Duoneb] 3 ml INH Q6H PRN #20 ml


 PRN Reason: Dyspnea


Benzonatate [Tessalon] 100 mg PO TID PRN #30 cap


 PRN Reason: Cough


Comments: 


The chest x-ray was normal; no evidence of pneumonia.  Realize that bronchitis 

("chest cold") does not show up on chest x-ray, and thus this is the suspected 

diagnosis at this point.


Your nasal swab tested positive for enterovirus/rhinovirus; these viruses cause 

upper respiratory infection symptoms but rarely cause any serious medical 

problems.  There is no specific treatment; the symptoms typically resolve within

 a few days, but sometimes linger for one or more weeks.


I have electronically submitted prescriptions for Tessalon Perles (cough 

medication) and DuoNebs ("breathing treatment" medication) to the [facility] 

pharmacy in [place].


Forms:  PCP List


Discharge Date/Time: 08/26/24 23:55

## 2024-08-26 NOTE — XRAY REPORT
PROCEDURE:  Chest 2V

 

INDICATIONS:  dyspnea, cough

 

TECHNIQUE:  2 views of the chest were acquired.  

 

COMPARISON:  5/3/2022

 

FINDINGS:  

 

Surgical changes and devices:  None.  

 

Lungs and pleura:  No pleural effusions or pneumothorax.  Lungs are clear.  

 

Mediastinum:  Mediastinal contours appear normal.  Heart size is normal.  

 

Bones and chest wall:  No suspicious bony lesions.  Overlying soft tissues appear unremarkable.  

 

 

IMPRESSION:  

 

No acute cardiopulmonary process.

 

No focal consolidation.

 

Reviewed by: Alex Jj MD on 8/26/2024 10:47 PM PDT

Approved by: Alex Jj MD on 8/26/2024 10:47 PM PDT

 

 

Station ID:  [id number]